# Patient Record
Sex: MALE | Race: WHITE
[De-identification: names, ages, dates, MRNs, and addresses within clinical notes are randomized per-mention and may not be internally consistent; named-entity substitution may affect disease eponyms.]

---

## 2018-05-16 ENCOUNTER — HOSPITAL ENCOUNTER (OUTPATIENT)
Dept: HOSPITAL 47 - RADCTMAIN | Age: 53
Discharge: HOME | End: 2018-05-16
Attending: SPECIALIST
Payer: COMMERCIAL

## 2018-05-16 DIAGNOSIS — S02.19XA: Primary | ICD-10-CM

## 2018-05-16 DIAGNOSIS — S02.0XXA: ICD-10-CM

## 2018-05-16 DIAGNOSIS — S06.0X1A: ICD-10-CM

## 2018-05-16 PROCEDURE — 70450 CT HEAD/BRAIN W/O DYE: CPT

## 2018-05-16 NOTE — CT
EXAMINATION TYPE: CT brain wo con

 

DATE OF EXAM: 5/16/2018

 

HISTORY: traumatic intracerebral hemorrhage. Headache and vision changes with dizziness, known brain 
bleed after injury April 21.

 

CT DLP: 1126 mGycm.  Automated Exposure Control for Dose Reduction was Utilized.

 

TECHNIQUE: CT scan of the head is performed without contrast.

 

COMPARISON: CT brain April 21, 2018.

 

FINDINGS:   There is interval resolution of acute right-sided intracranial hemorrhage. There is no ne
w acute intracranial hemorrhage or midline shift. There is diffuse ventricular and sulcal prominence 
consistent with diffuse age-related cerebral atrophy.  There is low-attenuation in the periventricula
r white matter consistent with chronic small vessel ischemic change. Comminuted minimally displaced s
kull fractures right frontal and temporal region are redemonstrated. Right superior orbital wall frac
ture is less well seen on this study versus prior facial CT study. The globes are intact bilaterally 
on current study. The paranasal sinuses remain clear.

 

IMPRESSION: Interval resolution of acute posttraumatic intracranial predominately subarachnoid hemorr
mary lou.  No new acute intracranial hemorrhage or midline shift. Redemonstration of comminuted mildly di
splaced right frontal temporal skull fractures.